# Patient Record
Sex: FEMALE | Race: BLACK OR AFRICAN AMERICAN | NOT HISPANIC OR LATINO | RURAL
[De-identification: names, ages, dates, MRNs, and addresses within clinical notes are randomized per-mention and may not be internally consistent; named-entity substitution may affect disease eponyms.]

---

## 2017-07-25 ENCOUNTER — HISTORICAL (OUTPATIENT)
Dept: ADMINISTRATIVE | Facility: HOSPITAL | Age: 32
End: 2017-07-25

## 2017-07-26 LAB
LAB AP CLINICAL INFORMATION: NORMAL
LAB AP DIAGNOSIS - HISTORICAL: NORMAL
LAB AP GROSS PATHOLOGY - HISTORICAL: NORMAL
LAB AP SPECIMEN SUBMITTED - HISTORICAL: NORMAL

## 2019-07-19 ENCOUNTER — HISTORICAL (OUTPATIENT)
Dept: ADMINISTRATIVE | Facility: HOSPITAL | Age: 34
End: 2019-07-19

## 2019-07-24 LAB
LAB AP CLINICAL INFORMATION: NORMAL
LAB AP COMMENTS: NORMAL
LAB AP GENERAL CAT - HISTORICAL: NORMAL
LAB AP INTERPRETATION/RESULT - HISTORICAL: NEGATIVE
LAB AP SPECIMEN ADEQUACY - HISTORICAL: NORMAL
LAB AP SPECIMEN SUBMITTED - HISTORICAL: NORMAL

## 2020-10-07 ENCOUNTER — HISTORICAL (OUTPATIENT)
Dept: ADMINISTRATIVE | Facility: HOSPITAL | Age: 35
End: 2020-10-07

## 2021-04-21 ENCOUNTER — TELEPHONE (OUTPATIENT)
Dept: OBSTETRICS AND GYNECOLOGY | Facility: CLINIC | Age: 36
End: 2021-04-21

## 2021-10-05 ENCOUNTER — OFFICE VISIT (OUTPATIENT)
Dept: FAMILY MEDICINE | Facility: CLINIC | Age: 36
End: 2021-10-05
Payer: MEDICAID

## 2021-10-05 VITALS — TEMPERATURE: 98 F | OXYGEN SATURATION: 98 % | HEART RATE: 89 BPM

## 2021-10-05 DIAGNOSIS — N30.00 ACUTE CYSTITIS WITHOUT HEMATURIA: ICD-10-CM

## 2021-10-05 DIAGNOSIS — Z11.52 ENCOUNTER FOR SCREENING FOR COVID-19: Primary | ICD-10-CM

## 2021-10-05 LAB
CTP QC/QA: YES
SARS-COV-2 AG RESP QL IA.RAPID: NEGATIVE

## 2021-10-05 PROCEDURE — 99051 PR MEDICAL SERVICES, EVE/WKEND/HOLIDAY: ICD-10-PCS | Mod: ,,, | Performed by: FAMILY MEDICINE

## 2021-10-05 PROCEDURE — 99213 PR OFFICE/OUTPT VISIT, EST, LEVL III, 20-29 MIN: ICD-10-PCS | Mod: ,,, | Performed by: FAMILY MEDICINE

## 2021-10-05 PROCEDURE — 99213 OFFICE O/P EST LOW 20 MIN: CPT | Mod: ,,, | Performed by: FAMILY MEDICINE

## 2021-10-05 PROCEDURE — 99051 MED SERV EVE/WKEND/HOLIDAY: CPT | Mod: ,,, | Performed by: FAMILY MEDICINE

## 2021-10-05 PROCEDURE — 87426 SARSCOV CORONAVIRUS AG IA: CPT | Mod: RHCUB | Performed by: FAMILY MEDICINE

## 2021-10-05 RX ORDER — NITROFURANTOIN 25; 75 MG/1; MG/1
100 CAPSULE ORAL 2 TIMES DAILY
Qty: 14 CAPSULE | Refills: 0 | Status: SHIPPED | OUTPATIENT
Start: 2021-10-05

## 2024-04-04 DIAGNOSIS — J30.9 ALLERGIC RHINITIS: Primary | ICD-10-CM

## 2024-04-10 ENCOUNTER — OFFICE VISIT (OUTPATIENT)
Dept: OTOLARYNGOLOGY | Facility: CLINIC | Age: 39
End: 2024-04-10
Payer: MEDICAID

## 2024-04-10 VITALS — BODY MASS INDEX: 40.12 KG/M2 | WEIGHT: 235 LBS | HEIGHT: 64 IN

## 2024-04-10 DIAGNOSIS — J30.9 ALLERGIC RHINITIS: ICD-10-CM

## 2024-04-10 DIAGNOSIS — J31.0 CHRONIC RHINITIS: Primary | ICD-10-CM

## 2024-04-10 PROCEDURE — 99204 OFFICE O/P NEW MOD 45 MIN: CPT | Mod: S$PBB,,, | Performed by: OTOLARYNGOLOGY

## 2024-04-10 PROCEDURE — 99213 OFFICE O/P EST LOW 20 MIN: CPT | Mod: PBBFAC | Performed by: OTOLARYNGOLOGY

## 2024-04-10 PROCEDURE — 1159F MED LIST DOCD IN RCRD: CPT | Mod: CPTII,,, | Performed by: OTOLARYNGOLOGY

## 2024-04-10 PROCEDURE — 1160F RVW MEDS BY RX/DR IN RCRD: CPT | Mod: CPTII,,, | Performed by: OTOLARYNGOLOGY

## 2024-04-10 PROCEDURE — 3008F BODY MASS INDEX DOCD: CPT | Mod: CPTII,,, | Performed by: OTOLARYNGOLOGY

## 2024-04-10 NOTE — PROGRESS NOTES
Subjective:       Patient ID: Becky Romero is a 39 y.o. female.    Chief Complaint: Allergic Rhinitis  (Pt states she has allergies, sinus drainage w/PND of thick white to green mucus. )    HPI  Review of Systems   HENT:  Positive for congestion, postnasal drip, rhinorrhea, sinus pressure and sinus pain.    All other systems reviewed and are negative.      Objective:      Physical Exam  General: NAD  Head: Normocephalic, atraumatic, no facial asymmetry/normal strength,  Ears: Both auricules normal in appearance, w/o deformities tympanic membranes normal external auditory canals normal  Nose: External nose w/o deformities congested turbinates no drainage or inflammation  Oral Cavity: Lips, gums, floor of mouth, tongue hard palate, and buccal mucosa without mass/lesion  Oropharynx: Mucosa pink and moist, soft palate, posterior pharynx and oropharyngeal wall without mass/lesion  Neck: Supple, symmetric, trachea midline, no palpable mass/lesion, no palpable cervical lymphadenopathy  Skin: Warm and dry, no concerning lesions  Respiratory: Respirations even, unlabored  Assessment:       1. Chronic rhinitis    2. Allergic rhinitis        Plan:       RAST for allergies may need CT sinus if negative

## 2024-04-21 ENCOUNTER — OFFICE VISIT (OUTPATIENT)
Dept: FAMILY MEDICINE | Facility: CLINIC | Age: 39
End: 2024-04-21
Payer: MEDICAID

## 2024-04-21 VITALS
RESPIRATION RATE: 18 BRPM | DIASTOLIC BLOOD PRESSURE: 87 MMHG | OXYGEN SATURATION: 96 % | TEMPERATURE: 98 F | SYSTOLIC BLOOD PRESSURE: 132 MMHG | HEIGHT: 64 IN | WEIGHT: 231 LBS | HEART RATE: 95 BPM | BODY MASS INDEX: 39.44 KG/M2

## 2024-04-21 DIAGNOSIS — J30.9 ALLERGIC RHINITIS, UNSPECIFIED SEASONALITY, UNSPECIFIED TRIGGER: Primary | ICD-10-CM

## 2024-04-21 DIAGNOSIS — Z11.59 ENCOUNTER FOR SCREENING FOR OTHER VIRAL DISEASES: ICD-10-CM

## 2024-04-21 LAB
CTP QC/QA: YES
MOLECULAR STREP A: NEGATIVE
POC MOLECULAR INFLUENZA A AGN: NEGATIVE
POC MOLECULAR INFLUENZA B AGN: NEGATIVE
SARS-COV-2 RDRP RESP QL NAA+PROBE: NEGATIVE

## 2024-04-21 PROCEDURE — 3008F BODY MASS INDEX DOCD: CPT | Mod: CPTII,,, | Performed by: NURSE PRACTITIONER

## 2024-04-21 PROCEDURE — 3075F SYST BP GE 130 - 139MM HG: CPT | Mod: CPTII,,, | Performed by: NURSE PRACTITIONER

## 2024-04-21 PROCEDURE — 1160F RVW MEDS BY RX/DR IN RCRD: CPT | Mod: CPTII,,, | Performed by: NURSE PRACTITIONER

## 2024-04-21 PROCEDURE — 99213 OFFICE O/P EST LOW 20 MIN: CPT | Mod: ,,, | Performed by: NURSE PRACTITIONER

## 2024-04-21 PROCEDURE — 87635 SARS-COV-2 COVID-19 AMP PRB: CPT | Mod: RHCUB | Performed by: NURSE PRACTITIONER

## 2024-04-21 PROCEDURE — 87651 STREP A DNA AMP PROBE: CPT | Mod: RHCUB | Performed by: NURSE PRACTITIONER

## 2024-04-21 PROCEDURE — 1159F MED LIST DOCD IN RCRD: CPT | Mod: CPTII,,, | Performed by: NURSE PRACTITIONER

## 2024-04-21 PROCEDURE — 87502 INFLUENZA DNA AMP PROBE: CPT | Mod: RHCUB | Performed by: NURSE PRACTITIONER

## 2024-04-21 PROCEDURE — 3079F DIAST BP 80-89 MM HG: CPT | Mod: CPTII,,, | Performed by: NURSE PRACTITIONER

## 2024-04-21 PROCEDURE — 99051 MED SERV EVE/WKEND/HOLIDAY: CPT | Mod: ,,, | Performed by: NURSE PRACTITIONER

## 2024-04-21 RX ORDER — FERROUS SULFATE 325(65) MG
TABLET ORAL
COMMUNITY
End: 2024-05-03

## 2024-04-21 RX ORDER — FLUTICASONE PROPIONATE 50 MCG
1 SPRAY, SUSPENSION (ML) NASAL DAILY
Qty: 15.8 ML | Refills: 0 | Status: SHIPPED | OUTPATIENT
Start: 2024-04-21 | End: 2024-05-01

## 2024-04-21 RX ORDER — PHENTERMINE HYDROCHLORIDE 37.5 MG/1
TABLET ORAL
COMMUNITY
End: 2024-04-21 | Stop reason: ALTCHOICE

## 2024-04-21 RX ORDER — ALBUTEROL SULFATE 90 UG/1
2 AEROSOL, METERED RESPIRATORY (INHALATION) EVERY 4 HOURS
COMMUNITY

## 2024-04-21 RX ORDER — METFORMIN HYDROCHLORIDE 500 MG/1
1 TABLET ORAL 2 TIMES DAILY
COMMUNITY

## 2024-04-21 RX ORDER — SERTRALINE HYDROCHLORIDE 50 MG/1
50 TABLET, FILM COATED ORAL EVERY MORNING
COMMUNITY
Start: 2024-03-22

## 2024-04-21 NOTE — PROGRESS NOTES
ALLIE Warner   [facility] - [facility] - [facility]  [facility] [address] [place] [address]  [phone]      PATIENT NAME: Becky Romero  : 1985  DATE: 24  MRN: [id number]      Billing Provider: ALLIE Warner  Level of Service: [place] OFFICE/OUTPT VISIT, EST, LEVL III, 20-29 MIN  Patient PCP Information       Provider PCP Type    Primary Doctor No General            Reason for Visit / Chief Complaint: Nasal Congestion, Sore Throat, Cough, and Otalgia (R ear pain /Symptoms started x2days/)       Update PCP  Update Chief Complaint         History of Present Illness / Problem Focused Workflow     Patient presents to clinic with the above listed complaints. Patient declined steroid shots today. States she will continue over the counter medications.       Review of Systems     Review of Systems   Constitutional:  Negative for chills, diaphoresis, fatigue and fever.   HENT:  Positive for congestion, ear pain and sore throat. Negative for facial swelling and trouble swallowing.    Eyes:  Negative for pain, discharge, redness, itching and visual disturbance.   Respiratory:  Positive for cough. Negative for apnea, chest tightness, shortness of breath and wheezing.    Cardiovascular:  Negative for chest pain, palpitations and leg swelling.   Gastrointestinal:  Negative for abdominal pain, constipation, diarrhea, nausea and vomiting.   Genitourinary:  Negative for dysuria.   Skin:  Negative for rash.   Neurological:  Negative for dizziness and headaches.       Medical / Social / Family History   No past medical history on file.    No past surgical history on file.    Social History  Ms.  reports that she has never smoked. She has never been exposed to tobacco smoke. She has never used smokeless tobacco. She reports that she does not drink alcohol and does not use drugs.    Family History  Ms.'s family history is not on  file.    Medications and Allergies     Medications  Current Outpatient Medications   Medication Sig Dispense Refill    albuterol (PROVENTIL/VENTOLIN HFA) 90 mcg/actuation inhaler Inhale 2 puffs into the lungs every 4 (four) hours.      ferrous sulfate (FEOSOL) 325 mg (65 mg iron) Tab tablet Take 1 tablet every day by oral route as directed for 30 days.      metFORMIN (GLUCOPHAGE) 500 MG tablet Take 1 tablet by mouth 2 (two) times daily.      phentermine (ADIPEX-P) 37.5 mg tablet TAKE 1 TABLET BY MOUTH ONCE DAILY IN THE MORNING FOR 30 DAYS      sertraline (ZOLOFT) 50 MG tablet Take 50 mg by mouth every morning.      metFORMIN (GLUCOPHAGE) 1000 MG tablet Take 1 tablet (1,000 mg total) by mouth 2 (two) times daily with meals. (Patient not taking: Reported on 4/21/2024) 60 tablet 0    nitrofurantoin, macrocrystal-monohydrate, (MACROBID) 100 MG capsule Take 1 capsule (100 mg total) by mouth 2 (two) times daily. (Patient not taking: Reported on 4/21/2024) 14 capsule 0     No current facility-administered medications for this visit.       Allergies  Review of patient's allergies indicates:   Allergen Reactions    Topiramate Rash and Other (See Comments)       Physical Examination     Vitals:    04/21/24 1052   BP: 132/87   Pulse: 95   Resp: 18   Temp: 98.3 °F (36.8 °C)     Physical Exam  Vitals and nursing note reviewed.   Constitutional:       General: She is awake.      Appearance: Normal appearance.   HENT:      Head: Normocephalic.      Right Ear: Tympanic membrane, ear canal and external ear normal.      Left Ear: Tympanic membrane, ear canal and external ear normal.      Nose: Congestion and rhinorrhea present.      Right Turbinates: Swollen.      Left Turbinates: Swollen.      Right Sinus: No maxillary sinus tenderness or frontal sinus tenderness.      Left Sinus: No maxillary sinus tenderness or frontal sinus tenderness.      Mouth/Throat:      Lips: Pink.      Mouth: Mucous membranes are moist.      Pharynx:  Oropharynx is clear. Uvula midline. No posterior oropharyngeal erythema.      Tonsils: No tonsillar exudate.   Eyes:      General: Lids are normal.      Extraocular Movements: Extraocular movements intact.      Conjunctiva/sclera: Conjunctivae normal.      Pupils: Pupils are equal, round, and reactive to light.   Cardiovascular:      Rate and Rhythm: Normal rate and regular rhythm.      Pulses: Normal pulses.      Heart sounds: Normal heart sounds. No murmur heard.  Pulmonary:      Effort: Pulmonary effort is normal.      Breath sounds: Normal breath sounds. No decreased breath sounds, wheezing, rhonchi or rales.   Musculoskeletal:      Right lower leg: No edema.      Left lower leg: No edema.   Skin:     General: Skin is warm.      Coloration: Skin is not jaundiced.      Findings: No rash.   Neurological:      Mental Status: She is alert. Mental status is at baseline.   Psychiatric:         Mood and Affect: Mood normal.         Behavior: Behavior normal. Behavior is cooperative.       Assessment and Plan (including Health Maintenance)      Problem List  Smart Sets  Document Outside HM   :    Health Maintenance Due   Topic Date Due    Hepatitis C Screening  Never done    Lipid Panel  Never done    HIV Screening  Never done    TETANUS VACCINE  Never done    Hemoglobin A1c (Diabetic Prevention Screening)  Never done    Cervical Cancer Screening  07/19/2022    Influenza Vaccine (1) Never done    COVID-19 Vaccine (1 - 2023-24 season) Never done       Problem List Items Addressed This Visit    None  Visit Diagnoses       Encounter for screening for other viral diseases    -  Primary    Relevant Orders    POCT COVID-19 Rapid Screening    POCT Strep A, Molecular    POCT Influenza A/B Molecular            The patient has no Health Maintenance topics of status Not Due    No future appointments.       Signature:  ALLIE [facility]  [address] [address]  [phone]    Date of encounter: 4/21/24

## 2024-05-03 ENCOUNTER — OFFICE VISIT (OUTPATIENT)
Dept: OBSTETRICS AND GYNECOLOGY | Facility: CLINIC | Age: 39
End: 2024-05-03
Payer: MEDICAID

## 2024-05-03 VITALS
RESPIRATION RATE: 17 BRPM | BODY MASS INDEX: 38.41 KG/M2 | DIASTOLIC BLOOD PRESSURE: 89 MMHG | HEIGHT: 64 IN | WEIGHT: 225 LBS | OXYGEN SATURATION: 99 % | TEMPERATURE: 98 F | HEART RATE: 86 BPM | SYSTOLIC BLOOD PRESSURE: 139 MMHG

## 2024-05-03 DIAGNOSIS — N92.0 MENORRHAGIA WITH REGULAR CYCLE: Primary | ICD-10-CM

## 2024-05-03 DIAGNOSIS — R73.03 PREDIABETES: ICD-10-CM

## 2024-05-03 DIAGNOSIS — E66.9 OBESITY (BMI 30-39.9): ICD-10-CM

## 2024-05-03 DIAGNOSIS — D50.0 IRON DEFICIENCY ANEMIA DUE TO CHRONIC BLOOD LOSS: ICD-10-CM

## 2024-05-03 DIAGNOSIS — E55.9 VITAMIN D INSUFFICIENCY: ICD-10-CM

## 2024-05-03 PROCEDURE — 1159F MED LIST DOCD IN RCRD: CPT | Mod: CPTII,,, | Performed by: ADVANCED PRACTICE MIDWIFE

## 2024-05-03 PROCEDURE — 96372 THER/PROPH/DIAG INJ SC/IM: CPT | Mod: ,,, | Performed by: ADVANCED PRACTICE MIDWIFE

## 2024-05-03 PROCEDURE — 3008F BODY MASS INDEX DOCD: CPT | Mod: CPTII,,, | Performed by: ADVANCED PRACTICE MIDWIFE

## 2024-05-03 PROCEDURE — 3079F DIAST BP 80-89 MM HG: CPT | Mod: CPTII,,, | Performed by: ADVANCED PRACTICE MIDWIFE

## 2024-05-03 PROCEDURE — 99213 OFFICE O/P EST LOW 20 MIN: CPT | Mod: 25,,, | Performed by: ADVANCED PRACTICE MIDWIFE

## 2024-05-03 PROCEDURE — 3075F SYST BP GE 130 - 139MM HG: CPT | Mod: CPTII,,, | Performed by: ADVANCED PRACTICE MIDWIFE

## 2024-05-03 RX ORDER — PHENTERMINE HYDROCHLORIDE 37.5 MG/1
37.5 TABLET ORAL
Qty: 30 TABLET | Refills: 0 | Status: SHIPPED | OUTPATIENT
Start: 2024-05-03 | End: 2024-06-02

## 2024-05-03 RX ORDER — MEDROXYPROGESTERONE ACETATE 150 MG/ML
150 INJECTION, SUSPENSION INTRAMUSCULAR ONCE
Status: COMPLETED | OUTPATIENT
Start: 2024-05-03 | End: 2024-05-03

## 2024-05-03 RX ORDER — FERROUS SULFATE 325(65) MG
325 TABLET, DELAYED RELEASE (ENTERIC COATED) ORAL 2 TIMES DAILY
Qty: 60 TABLET | Refills: 2 | Status: SHIPPED | OUTPATIENT
Start: 2024-05-03 | End: 2024-08-01

## 2024-05-03 RX ORDER — PHENTERMINE HYDROCHLORIDE 37.5 MG/1
37.5 TABLET ORAL
COMMUNITY
Start: 2024-04-01 | End: 2024-05-03 | Stop reason: SDUPTHER

## 2024-05-03 RX ADMIN — MEDROXYPROGESTERONE ACETATE 150 MG: 150 INJECTION, SUSPENSION INTRAMUSCULAR at 03:05

## 2024-05-03 NOTE — PROGRESS NOTES
"  Patient ID:  Becky Romero is a 39 y.o. female      Chief Complaint:   Chief Complaint   Patient presents with    Vaginal Bleeding     Heavy bleeding        HPI:  Becky Romero is in with c/o heavy menstrual cycle. Cycles occur monthly lasting 7 days with 5 days of heavy bleeding. Has had BTL. Reports hx anemia. Reviewed labs obtained by PCP on 3/2024. H&H 9.5/31.5, HgbA1C 5.9 increased from 5.7 in , vitamin D level increased to 26 from 17. Discussed management of menorrhagia with hormonal contraceptive. Has used depo in past with no s/e, desires to start for menses suppression. Agrees to rx for treatment of anemia. Has lost 10 lbs since starting Adipex, requesting refill. Due for annual exam with Pap. Last Pap NILM 2019.   LMP: Patient's last menstrual period was 2024.  Sexually active: yes, declines STD testing    Past Medical History:   Diagnosis Date    Anemia     Anxiety     Cancer     Depression        Past Surgical History:   Procedure Laterality Date    CERVICAL BIOPSY  W/ LOOP ELECTRODE EXCISION       SECTION      TUBAL LIGATION         OB History          8    Para   7    Term                AB        Living             SAB        IAB        Ectopic        Multiple        Live Births   7           Obstetric Comments   Vaginal births x 5,  x 2               /89 (BP Location: Left arm)   Pulse 86   Temp 98.2 °F (36.8 °C)   Resp 17   Ht 5' 4" (1.626 m)   Wt 102.1 kg (225 lb)   LMP 2024   SpO2 99%   BMI 38.62 kg/m²   Wt Readings from Last 3 Encounters:   24 102.1 kg (225 lb)   24 104.8 kg (231 lb)   04/10/24 106.6 kg (235 lb)          ROS:  Review of Systems   Constitutional: Negative.    Eyes: Negative.    Respiratory: Negative.     Cardiovascular: Negative.    Gastrointestinal: Negative.    Genitourinary:  Positive for menorrhagia and menstrual problem.   Musculoskeletal: Negative.    Neurological: Negative.  "   Psychiatric/Behavioral: Negative.            PHYSICAL EXAM:  Physical Exam  Constitutional:       Appearance: Normal appearance. She is obese.   Eyes:      Extraocular Movements: Extraocular movements intact.   Cardiovascular:      Rate and Rhythm: Normal rate.      Pulses: Normal pulses.   Pulmonary:      Effort: Pulmonary effort is normal.   Musculoskeletal:         General: Normal range of motion.      Cervical back: Normal range of motion.   Skin:     General: Skin is warm and dry.   Neurological:      Mental Status: She is alert and oriented to person, place, and time.   Psychiatric:         Mood and Affect: Mood normal.         Behavior: Behavior normal.         Thought Content: Thought content normal.         Judgment: Judgment normal.          Assessment:  Menorrhagia with regular cycle  -     medroxyPROGESTERone (DEPO-PROVERA) syringe 150 mg  -     POCT urine pregnancy    Iron deficiency anemia due to chronic blood loss  -     ferrous sulfate 325 (65 FE) MG EC tablet; Take 1 tablet (325 mg total) by mouth 2 (two) times daily.  Dispense: 60 tablet; Refill: 2    Obesity (BMI 30-39.9)  -     phentermine (ADIPEX-P) 37.5 mg tablet; Take 1 tablet (37.5 mg total) by mouth before breakfast.  Dispense: 30 tablet; Refill: 0    Prediabetes  -     phentermine (ADIPEX-P) 37.5 mg tablet; Take 1 tablet (37.5 mg total) by mouth before breakfast.  Dispense: 30 tablet; Refill: 0    Vitamin D insufficiency          ICD-10-CM ICD-9-CM    1. Menorrhagia with regular cycle  N92.0 626.2 medroxyPROGESTERone (DEPO-PROVERA) syringe 150 mg      POCT urine pregnancy      2. Iron deficiency anemia due to chronic blood loss  D50.0 280.0 ferrous sulfate 325 (65 FE) MG EC tablet      3. Obesity (BMI 30-39.9)  E66.9 278.00 phentermine (ADIPEX-P) 37.5 mg tablet      4. Prediabetes  R73.03 790.29 phentermine (ADIPEX-P) 37.5 mg tablet      5. Vitamin D insufficiency  E55.9 268.9           Plan:  Depo Provera 150 mg IM given, Reviewed  ""ACHES" of contraceptives and possible S/E  Encouraged Vitamin D and calcium supplements or daily multivitamin  Instructed on condom use during each sexual encounter to decrease STD exposure risk  Discussed continued lifestyle changes to decrease diabetes risk  Encouraged exercising at least 3-4 x weekly, increased water intake, avoid sugary food and beverages, choose healthier dietary options  Rx sent for Adipex refill, instructed no further refills will be given, encouraged to schedule follow up appointment with PADDY KELLEY  Rx sent for ferrous sulfate Bid to treat anemia, encouraged to eat more iron rich foods  Encouraged daily multivitamin or Vitamin D3 supplement    Follow up for annual exam with Pap.            Answers submitted by the patient for this visit:  Vaginal Bleeding Questionnaire  (Submitted on 5/3/2024)  Chief Complaint: Vaginal bleeding  Onset: more than 1 year ago  Progression since onset: gradually worsening  Pain severity: severe  Affected side: both  Pregnant now?: No  abdominal pain: No  anorexia: No  back pain: Yes  chills: No  constipation: Yes  diarrhea: No  discolored urine: No  dysuria: No  fever: No  flank pain: Yes  frequency: No  headaches: Yes  hematuria: No  nausea: No  painful intercourse: No  rash: No  urgency: No  vomiting: No  Vaginal bleeding: heavier than menses  Passing clots?: Yes  Passing tissue?: No  Aggravated by: activity, bowel movements, heavy lifting  treatments tried: acetaminophen, anti-fungal cream, heating pad, NSAIDs, warm bath  Improvement on treatment: no relief  Sexual activity: sexually active  Partner with STD symptoms: no  STD: No  abdominal surgery: No   section: Yes  Ectopic pregnancy: No  herpes simplex: No  gynecological surgery: No  menorrhagia: Yes  metrorrhagia: No  miscarriage: Yes  ovarian cysts: Yes  perineal abscess: No  PID: No  terminated pregnancy: No  vaginosis: No    "

## 2024-09-06 DIAGNOSIS — D22.9 MELANOCYTIC NEVUS OF SKIN: Primary | ICD-10-CM

## 2024-09-16 ENCOUNTER — OFFICE VISIT (OUTPATIENT)
Dept: OBSTETRICS AND GYNECOLOGY | Facility: CLINIC | Age: 39
End: 2024-09-16
Payer: MEDICAID

## 2024-09-16 VITALS
HEIGHT: 64 IN | SYSTOLIC BLOOD PRESSURE: 120 MMHG | RESPIRATION RATE: 17 BRPM | OXYGEN SATURATION: 99 % | WEIGHT: 255.63 LBS | HEART RATE: 98 BPM | DIASTOLIC BLOOD PRESSURE: 80 MMHG | BODY MASS INDEX: 43.64 KG/M2

## 2024-09-16 DIAGNOSIS — Z01.419 WOMEN'S ANNUAL ROUTINE GYNECOLOGICAL EXAMINATION: Primary | ICD-10-CM

## 2024-09-16 DIAGNOSIS — Z11.3 ENCOUNTER FOR SPECIAL SCREENING EXAMINATION FOR INFECTION WITH PREDOMINANTLY SEXUAL MODE OF TRANSMISSION: ICD-10-CM

## 2024-09-16 DIAGNOSIS — Z11.51 SCREENING FOR HPV (HUMAN PAPILLOMAVIRUS): ICD-10-CM

## 2024-09-16 DIAGNOSIS — Z12.4 ENCOUNTER FOR PAPANICOLAOU SMEAR FOR CERVICAL CANCER SCREENING: ICD-10-CM

## 2024-09-16 DIAGNOSIS — Z72.51 HIGH RISK HETEROSEXUAL BEHAVIOR: ICD-10-CM

## 2024-09-16 DIAGNOSIS — E66.01 MORBID OBESITY WITH BMI OF 40.0-44.9, ADULT: ICD-10-CM

## 2024-09-16 LAB
CANDIDA SPECIES: NEGATIVE
GARDNERELLA: POSITIVE
TRICHOMONAS: NEGATIVE

## 2024-09-16 PROCEDURE — 87480 CANDIDA DNA DIR PROBE: CPT | Mod: ,,, | Performed by: CLINICAL MEDICAL LABORATORY

## 2024-09-16 PROCEDURE — 87491 CHLMYD TRACH DNA AMP PROBE: CPT | Mod: ,,, | Performed by: CLINICAL MEDICAL LABORATORY

## 2024-09-16 PROCEDURE — 1159F MED LIST DOCD IN RCRD: CPT | Mod: CPTII,,, | Performed by: ADVANCED PRACTICE MIDWIFE

## 2024-09-16 PROCEDURE — 87660 TRICHOMONAS VAGIN DIR PROBE: CPT | Mod: ,,, | Performed by: CLINICAL MEDICAL LABORATORY

## 2024-09-16 PROCEDURE — 3074F SYST BP LT 130 MM HG: CPT | Mod: CPTII,,, | Performed by: ADVANCED PRACTICE MIDWIFE

## 2024-09-16 PROCEDURE — 88142 CYTOPATH C/V THIN LAYER: CPT | Mod: TC,GCY | Performed by: ADVANCED PRACTICE MIDWIFE

## 2024-09-16 PROCEDURE — 87624 HPV HI-RISK TYP POOLED RSLT: CPT | Mod: ,,, | Performed by: CLINICAL MEDICAL LABORATORY

## 2024-09-16 PROCEDURE — 87591 N.GONORRHOEAE DNA AMP PROB: CPT | Mod: ,,, | Performed by: CLINICAL MEDICAL LABORATORY

## 2024-09-16 PROCEDURE — 3079F DIAST BP 80-89 MM HG: CPT | Mod: CPTII,,, | Performed by: ADVANCED PRACTICE MIDWIFE

## 2024-09-16 PROCEDURE — 3008F BODY MASS INDEX DOCD: CPT | Mod: CPTII,,, | Performed by: ADVANCED PRACTICE MIDWIFE

## 2024-09-16 PROCEDURE — 87510 GARDNER VAG DNA DIR PROBE: CPT | Mod: ,,, | Performed by: CLINICAL MEDICAL LABORATORY

## 2024-09-16 PROCEDURE — 99395 PREV VISIT EST AGE 18-39: CPT | Mod: ,,, | Performed by: ADVANCED PRACTICE MIDWIFE

## 2024-09-16 RX ORDER — PHENTERMINE HYDROCHLORIDE 37.5 MG/1
37.5 TABLET ORAL EVERY MORNING
COMMUNITY
Start: 2024-09-03

## 2024-09-16 NOTE — PROGRESS NOTES
Patient ID: Becky Romero is a 39 y.o. female     Chief Complaint:   Chief Complaint   Patient presents with    Annual Exam    STD CHECK       HPI: Becky Romero is a 39 y.o. female who presents for well woman exam with Pap.  No issues, problems, or complaints. Reports past hx of early stage cervical cancer in , received care in [place]. Pap tests NILM 2019 & 2016. Has hx heavy menstrual bleeding treated with depo injection 2024. Has not returned for any further injections, no bleeding since initial treatment.   LMP: No LMP recorded.  Sexually active: yes   Contraceptive: BTL  Mammogram: n/a    Past Medical History:   Diagnosis Date    Anemia     Anxiety     Cancer     Depression        Past Surgical History:   Procedure Laterality Date    CERVICAL BIOPSY  W/ LOOP ELECTRODE EXCISION       SECTION      TUBAL LIGATION         Social History     Socioeconomic History    Marital status:    Tobacco Use    Smoking status: Never     Passive exposure: Never    Smokeless tobacco: Never   Substance and Sexual Activity    Alcohol use: Never    Drug use: Never    Sexual activity: Yes     Partners: Male     Birth control/protection: Abstinence, Condom, I.U.D.     Social Determinants of Health     Financial Resource Strain: Low Risk  (2024)    Overall Financial Resource Strain (CARDIA)     Difficulty of Paying Living Expenses: Not very hard   Food Insecurity: Food Insecurity Present (2024)    Hunger Vital Sign     Worried About Running Out of Food in the Last Year: Sometimes true     Ran Out of Food in the Last Year: Never true   Physical Activity: Insufficiently Active (2024)    Exercise Vital Sign     Days of Exercise per Week: 3 days     Minutes of Exercise per Session: 30 min   Stress: Stress Concern Present (2024)    [employer] [place] [employer] - Occupational Stress Questionnaire     Feeling of Stress : Very much   Housing Stability: Unknown  "(2024)    Housing Stability Vital Sign     Unable to Pay for Housing in the Last Year: No       Family History   Problem Relation Name Age of Onset    Asthma Mother Kylah     Migraines Mother Kylah     Cervical cancer Mother Kylah        OB History          8    Para   7    Term                AB        Living             SAB        IAB        Ectopic        Multiple        Live Births   7           Obstetric Comments   Vaginal births x 5,  x 2               /80 (BP Location: Left arm)   Pulse 98   Resp 17   Ht 5' 4" (1.626 m)   Wt 115.9 kg (255 lb 9.6 oz)   SpO2 99%   BMI 43.87 kg/m²     Wt Readings from Last 3 Encounters:   24 115.9 kg (255 lb 9.6 oz)   24 102.1 kg (225 lb)   24 104.8 kg (231 lb)        Review of Systems   Constitutional: Negative.    Eyes: Negative.    Respiratory: Negative.     Cardiovascular: Negative.    Gastrointestinal: Negative.    Endocrine: Negative.    Genitourinary: Negative.    Musculoskeletal: Negative.    Integumentary:  Negative.   Neurological: Negative.    Hematological: Negative.    Psychiatric/Behavioral: Negative.     Breast: negative.         Physical Exam   GENERAL: Well-developed, well-nourished morbidly obese female in no acute distress  NECK: Supple with full range of motion. No adenopathy, masses, or thyromegaly  SKIN: Normal to inspection with no rashes, lesions, or ulcers  LUNGS: Clear bilaterally with no rales, rhonchi, or wheezes   CARDIOVASCULAR AUSCULTATION: Normal heart rate and rhythm  BREASTS: No masses, lumps, discharge, tenderness, or skin changes  LYMPH NODES: No axillary, or inguinal adenopathy  ABDOMEN: large, Soft, non tender, without masses. No palpable hernia or  hepatosplenomegaly  VULVA: General appearance normal; external genitalia without lesions or rash  URETHRA: Normal size and location with no lesions or prolapse  VAGINA: Mucosa normal, no discharge or lesions  CERVIX: Pink without " lesions, discharge or tenderness  UTERUS: Regular, mobile, and non tender;  consistency is normal. No evidence of adnexa masses, tenderness, or nodularity  ANUS: No lesions or relaxation.  LOWER EXTREMITIES: No edema or tenderness  PSYCHIATRIC: Patient is oriented to person, place, and time. Mood and affect are normal      Assessment:  Women's annual routine gynecological examination  -     Bacterial Vaginosis; Future; Expected date: 09/16/2024  -     Chlamydia/GC, PCR; Future; Expected date: 09/16/2024  -     ThinPrep Pap Test; Future; Expected date: 09/16/2024    Encounter for Papanicolaou smear for cervical cancer screening  -     ThinPrep Pap Test; Future; Expected date: 09/16/2024    Screening for HPV (human papillomavirus)  -     ThinPrep Pap Test; Future; Expected date: 09/16/2024    High risk heterosexual behavior  -     Bacterial Vaginosis; Future; Expected date: 09/16/2024  -     Chlamydia/GC, PCR; Future; Expected date: 09/16/2024    Encounter for special screening examination for infection with predominantly sexual mode of transmission  -     Bacterial Vaginosis; Future; Expected date: 09/16/2024  -     Chlamydia/GC, PCR; Future; Expected date: 09/16/2024    Morbid obesity with BMI of 40.0-44.9, adult          ICD-10-CM ICD-9-CM    1. Women's annual routine gynecological examination  Z01.419 V72.31 Bacterial Vaginosis      Chlamydia/GC, PCR      ThinPrep Pap Test      Chlamydia/GC, PCR      2. Encounter for Papanicolaou smear for cervical cancer screening  Z12.4 V76.2 ThinPrep Pap Test      3. Screening for HPV (human papillomavirus)  Z11.51 V73.81 ThinPrep Pap Test      4. High risk heterosexual behavior  Z72.51 V69.2 Bacterial Vaginosis      Chlamydia/GC, PCR      Chlamydia/GC, PCR      5. Encounter for special screening examination for infection with predominantly sexual mode of transmission  Z11.3 V74.5 Bacterial Vaginosis      Chlamydia/GC, PCR      Chlamydia/GC, PCR      6. Morbid obesity with BMI  of 40.0-44.9, adult  E66.01 278.01     Z68.41 V85.41           Plan:  Annual exam completed, Pap and affirm specimens obtained  Urine sent for GC/CT testing  Will call or send My Chart message after results reviewed  Patient was counseled today on ASCCP Pap with HPV  guidelines and recommendations for yearly pelvic exams     Follow up in about 1 year (around 9/16/2025) for annual gyn exam.

## 2024-09-17 DIAGNOSIS — N76.0 BACTERIAL VAGINAL INFECTION: Primary | ICD-10-CM

## 2024-09-17 DIAGNOSIS — B96.89 BACTERIAL VAGINAL INFECTION: Primary | ICD-10-CM

## 2024-09-17 LAB
CHLAMYDIA BY PCR: NEGATIVE
GH SERPL-MCNC: NORMAL NG/ML
INSULIN SERPL-ACNC: NORMAL U[IU]/ML
LAB AP CLINICAL INFORMATION: NORMAL
LAB AP GYN INTERPRETATION: NEGATIVE
LAB AP PAP DISCLAIMER COMMENTS: NORMAL
N. GONORRHOEAE (GC) BY PCR: NEGATIVE
RENIN PLAS-CCNC: NORMAL NG/ML/H

## 2024-09-17 RX ORDER — FLUCONAZOLE 150 MG/1
150 TABLET ORAL
Qty: 2 TABLET | Refills: 0 | Status: SHIPPED | OUTPATIENT
Start: 2024-09-17 | End: 2024-09-25

## 2024-09-17 RX ORDER — METRONIDAZOLE 500 MG/1
500 TABLET ORAL 2 TIMES DAILY
Qty: 14 TABLET | Refills: 0 | Status: SHIPPED | OUTPATIENT
Start: 2024-09-17 | End: 2024-09-24

## 2024-09-19 ENCOUNTER — OFFICE VISIT (OUTPATIENT)
Dept: OTOLARYNGOLOGY | Facility: CLINIC | Age: 39
End: 2024-09-19
Payer: MEDICAID

## 2024-09-19 VITALS — BODY MASS INDEX: 43.54 KG/M2 | WEIGHT: 255 LBS | HEIGHT: 64 IN

## 2024-09-19 DIAGNOSIS — J30.1 SEASONAL ALLERGIC RHINITIS DUE TO POLLEN: ICD-10-CM

## 2024-09-19 DIAGNOSIS — R09.82 PND (POST-NASAL DRIP): ICD-10-CM

## 2024-09-19 DIAGNOSIS — J32.9 CHRONIC SINUSITIS, UNSPECIFIED LOCATION: Primary | ICD-10-CM

## 2024-09-19 PROCEDURE — 99214 OFFICE O/P EST MOD 30 MIN: CPT | Mod: S$PBB,,, | Performed by: OTOLARYNGOLOGY

## 2024-09-19 PROCEDURE — 3008F BODY MASS INDEX DOCD: CPT | Mod: CPTII,,, | Performed by: OTOLARYNGOLOGY

## 2024-09-19 PROCEDURE — 1159F MED LIST DOCD IN RCRD: CPT | Mod: CPTII,,, | Performed by: OTOLARYNGOLOGY

## 2024-09-19 PROCEDURE — 99999 PR PBB SHADOW E&M-EST. PATIENT-LVL III: CPT | Mod: PBBFAC,,, | Performed by: OTOLARYNGOLOGY

## 2024-09-19 PROCEDURE — 99213 OFFICE O/P EST LOW 20 MIN: CPT | Mod: PBBFAC | Performed by: OTOLARYNGOLOGY

## 2024-09-19 PROCEDURE — 1160F RVW MEDS BY RX/DR IN RCRD: CPT | Mod: CPTII,,, | Performed by: OTOLARYNGOLOGY

## 2024-09-19 RX ORDER — AMOXICILLIN AND CLAVULANATE POTASSIUM 500; 125 MG/1; MG/1
1 TABLET, FILM COATED ORAL 2 TIMES DAILY
Qty: 28 TABLET | Refills: 0 | Status: SHIPPED | OUTPATIENT
Start: 2024-09-19

## 2024-09-19 NOTE — PROGRESS NOTES
Subjective:       Patient ID: Becky Romero is a 39 y.o. female.    Chief Complaint: Follow-up (Patient following up for sinus. She complains of sinus congestion and drainage. States she is not any better.)    Follow-up  Associated symptoms include congestion.     Review of Systems   HENT:  Positive for congestion, postnasal drip, rhinorrhea and sinus pressure.    All other systems reviewed and are negative.      Objective:      Physical Exam  General: NAD  Head: Normocephalic, atraumatic, no facial asymmetry/normal strength,  Ears: Both auricules normal in appearance, w/o deformities tympanic membranes normal external auditory canals normal  Nose: External nose w/o deformities normal turbinates no drainage or inflammation  Oral Cavity: Lips, gums, floor of mouth, tongue hard palate, and buccal mucosa without mass/lesion PND   Oropharynx: Mucosa pink and moist, soft palate, posterior pharynx and oropharyngeal wall without mass/lesion  Neck: Supple, symmetric, trachea midline, no palpable mass/lesion, no palpable cervical lymphadenopathy  Skin: Warm and dry, no concerning lesions  Respiratory: Respirations even, unlabored  Assessment:       1. Chronic sinusitis, unspecified location    2. Seasonal allergic rhinitis due to pollen    3. PND (post-nasal drip)        Plan:       Augmentin for sinusitis  Start allergy shots

## 2024-09-20 DIAGNOSIS — J32.9 CHRONIC SINUSITIS, UNSPECIFIED LOCATION: Primary | ICD-10-CM

## 2024-09-20 DIAGNOSIS — J30.89 NON-SEASONAL ALLERGIC RHINITIS DUE TO OTHER ALLERGIC TRIGGER: ICD-10-CM

## 2024-09-20 DIAGNOSIS — J31.0 CHRONIC RHINITIS: ICD-10-CM

## 2024-09-20 DIAGNOSIS — R09.82 PND (POST-NASAL DRIP): ICD-10-CM

## 2024-09-20 DIAGNOSIS — J30.1 SEASONAL ALLERGIC RHINITIS DUE TO POLLEN: ICD-10-CM

## 2024-09-20 DIAGNOSIS — J30.1 NON-SEASONAL ALLERGIC RHINITIS DUE TO POLLEN: ICD-10-CM

## 2024-09-20 LAB
HPV 16: NEGATIVE
HPV 18: NEGATIVE
HPV OTHER: NEGATIVE

## 2024-10-23 ENCOUNTER — CLINICAL SUPPORT (OUTPATIENT)
Dept: OTOLARYNGOLOGY | Facility: CLINIC | Age: 39
End: 2024-10-23
Payer: MEDICAID

## 2024-10-23 DIAGNOSIS — J30.1 SEASONAL ALLERGIC RHINITIS DUE TO POLLEN: Primary | ICD-10-CM

## 2024-10-23 DIAGNOSIS — J30.89 NON-SEASONAL ALLERGIC RHINITIS DUE TO OTHER ALLERGIC TRIGGER: ICD-10-CM

## 2024-10-23 DIAGNOSIS — J31.0 CHRONIC RHINITIS: ICD-10-CM

## 2024-10-23 DIAGNOSIS — J30.1 NON-SEASONAL ALLERGIC RHINITIS DUE TO POLLEN: ICD-10-CM

## 2024-10-23 PROCEDURE — 99999 PR PBB SHADOW E&M-EST. PATIENT-LVL II: CPT | Mod: PBBFAC,,,

## 2024-10-23 PROCEDURE — 95165 ANTIGEN THERAPY SERVICES: CPT | Mod: PBBFAC | Performed by: OTOLARYNGOLOGY

## 2024-10-23 PROCEDURE — 95165 ANTIGEN THERAPY SERVICES: CPT | Mod: S$PBB,,, | Performed by: OTOLARYNGOLOGY

## 2024-10-23 PROCEDURE — 95115 IMMUNOTHERAPY ONE INJECTION: CPT | Mod: PBBFAC | Performed by: OTOLARYNGOLOGY

## 2024-10-23 NOTE — PROGRESS NOTES
Established patient with Dr. Snyder. See previous note for written order. Allergy injections per Dr. Snyder.   Vial test administered from new vial A.  10 minute vial test mm reactions  Administered right arm at 12:52 PM  3mm observed   First dose of 0.02ml given.  20 minute mm reaction  12:52 PM   right arm; observation 5mm

## 2024-11-11 ENCOUNTER — CLINICAL SUPPORT (OUTPATIENT)
Dept: OTOLARYNGOLOGY | Facility: CLINIC | Age: 39
End: 2024-11-11
Payer: MEDICAID

## 2024-11-11 DIAGNOSIS — J30.1 SEASONAL ALLERGIC RHINITIS DUE TO POLLEN: Primary | ICD-10-CM

## 2024-11-11 DIAGNOSIS — J30.1 NON-SEASONAL ALLERGIC RHINITIS DUE TO POLLEN: ICD-10-CM

## 2024-11-11 DIAGNOSIS — J30.89 NON-SEASONAL ALLERGIC RHINITIS DUE TO OTHER ALLERGIC TRIGGER: ICD-10-CM

## 2024-11-11 DIAGNOSIS — J31.0 CHRONIC RHINITIS: ICD-10-CM

## 2024-11-11 PROCEDURE — 99999 PR PBB SHADOW E&M-EST. PATIENT-LVL II: CPT | Mod: PBBFAC,,,

## 2024-11-11 PROCEDURE — 95115 IMMUNOTHERAPY ONE INJECTION: CPT | Mod: PBBFAC | Performed by: OTOLARYNGOLOGY

## 2024-11-11 NOTE — PROGRESS NOTES
Established patient with Dr. Snyder. See previous note for written order. Allergy injections per Dr. Snyder.   20 minute mm reaction  10:53 AM   right arm; observation 7mm

## 2025-01-15 ENCOUNTER — OFFICE VISIT (OUTPATIENT)
Dept: DERMATOLOGY | Facility: CLINIC | Age: 40
End: 2025-01-15
Payer: MEDICAID

## 2025-01-15 DIAGNOSIS — J30.9 ALLERGIC SHINERS: Primary | ICD-10-CM

## 2025-01-15 DIAGNOSIS — D48.9 NEOPLASM OF UNCERTAIN BEHAVIOR: ICD-10-CM

## 2025-01-15 PROCEDURE — 11102 TANGNTL BX SKIN SINGLE LES: CPT | Mod: ,,, | Performed by: DERMATOLOGY

## 2025-01-15 PROCEDURE — 99203 OFFICE O/P NEW LOW 30 MIN: CPT | Mod: 25,,, | Performed by: DERMATOLOGY

## 2025-01-15 PROCEDURE — 11103 TANGNTL BX SKIN EA SEP/ADDL: CPT | Mod: ,,, | Performed by: DERMATOLOGY

## 2025-01-15 PROCEDURE — 1160F RVW MEDS BY RX/DR IN RCRD: CPT | Mod: CPTII,,, | Performed by: DERMATOLOGY

## 2025-01-15 PROCEDURE — 88304 TISSUE EXAM BY PATHOLOGIST: CPT | Mod: 26,,, | Performed by: PATHOLOGY

## 2025-01-15 PROCEDURE — 88304 TISSUE EXAM BY PATHOLOGIST: CPT | Mod: TC,SUR | Performed by: DERMATOLOGY

## 2025-01-15 PROCEDURE — 1159F MED LIST DOCD IN RCRD: CPT | Mod: CPTII,,, | Performed by: DERMATOLOGY

## 2025-01-15 NOTE — PROGRESS NOTES
[facility]   Bianca Solis MD    Patient Name: Becky Romero  Patient YOB: 1985   Date of Service: 1/15/25    CC: Lesions    HPI: Becky Romero is a 39 y.o. female here today for lesions, located on the face, neck, and axilla.  Lesions have been present for 3 years.  Previous treatments include none.  Patient is also concerned today about hyperpigmentation under the eyes.    Past Medical History:   Diagnosis Date    Anemia     Anxiety     Cancer     Depression      Past Surgical History:   Procedure Laterality Date    CERVICAL BIOPSY  W/ LOOP ELECTRODE EXCISION       SECTION      TUBAL LIGATION       Review of patient's allergies indicates:   Allergen Reactions    Cat/feline products      Cat epithelium    Cockroach     Dog dander      Dog hair & epithelia    Elder (sambucus nigra, european)      Rough marshelder    Grass pollen-bubba, standard     House dust mite      D. Ptery, D. Farinae       Mold      Alternaria tenuis    Tree pollen-black walnut     Tree pollen-mountain cedar     Tree pollen-pecan     Tree pollen-shagbark hickory     Tree pollen-white birch     Topiramate Rash and Other (See Comments)       Current Outpatient Medications:     albuterol (PROVENTIL/VENTOLIN HFA) 90 mcg/actuation inhaler, Inhale 2 puffs into the lungs every 4 (four) hours., Disp: , Rfl:     amoxicillin-clavulanate 500-125mg (AUGMENTIN) 500-125 mg Tab, Take 1 tablet (500 mg total) by mouth 2 (two) times daily., Disp: 28 tablet, Rfl: 0    metFORMIN (GLUCOPHAGE) 500 MG tablet, Take 1 tablet by mouth 2 (two) times daily., Disp: , Rfl:     phentermine (ADIPEX-P) 37.5 mg tablet, Take 37.5 mg by mouth every morning., Disp: , Rfl:     sertraline (ZOLOFT) 50 MG tablet, Take 50 mg by mouth every morning., Disp: , Rfl:     ROS: A focused review of systems was obtained and negative.     Exam: A focused skin exam was performed. All areas examined were normal except as mentioned in the assessment  and plan below.  General Appearance of the patient is well developed and well nourished.  Orientation: alert and oriented x 3.  Mood and affect: pleasant.    Assessment:   The primary encounter diagnosis was Allergic shiners. A diagnosis of Neoplasm of uncertain behavior was also pertinent to this visit.    Plan:          Neoplasm of Uncertain Behavior (D48.5)  - Brown pedunculated papule located on the left cheek  Ddx includes: Acrochordon    Plan: Biopsy by Shave Method.  Location (1): Left cheek  Written consent was obtained and risks were reviewed including but not  limited to scarring, infection, bleeding, scabbing, incomplete removal, nerve damage and allergy to anesthesia.  The area was prepped with Chloraprep. Local anesthesia was obtained with approximately 0.5cc of 1% lidocaine  with epinephrine. A biopsy by shave method to the level of the dermis (sent for H and E) was performed using  a Dermablade on the above location. Aluminum chloride was used for hemostasis. Following the biopsy  Petrolatum and a bandage were applied. Patient will be notified of biopsy results. However, patient instructed to  call the office if not contacted within 2 weeks.    - Brown pedunculated papules located on the right neck  Ddx includes: Acrochordon    Plan: Biopsy by Shave Method.  Location (2): Right neck  Written consent was obtained and risks were reviewed including but not  limited to scarring, infection, bleeding, scabbing, incomplete removal, nerve damage and allergy to anesthesia.  The area was prepped with Chloraprep. Local anesthesia was obtained with approximately 0.5cc of 1% lidocaine  with epinephrine. A biopsy by shave method to the level of the dermis (sent for H and E) was performed using  a Dermablade on the above location. Aluminum chloride was used for hemostasis. Following the biopsy  Petrolatum and a bandage were applied. Patient will be notified of biopsy results. However, patient instructed to  call the  office if not contacted within 2 weeks.    - Brown pedunculated papules located on the left neck  Ddx includes: Acrochordon    Plan: Biopsy by Shave Method.  Location (3): Left neck  Written consent was obtained and risks were reviewed including but not  limited to scarring, infection, bleeding, scabbing, incomplete removal, nerve damage and allergy to anesthesia.  The area was prepped with Chloraprep. Local anesthesia was obtained with approximately 0.5cc of 1% lidocaine  with epinephrine. A biopsy by shave method to the level of the dermis (sent for H and E) was performed using  a Dermablade on the above location. Aluminum chloride was used for hemostasis. Following the biopsy  Petrolatum and a bandage were applied. Patient will be notified of biopsy results. However, patient instructed to  call the office if not contacted within 2 weeks.    - Brown pedunculated papules located on the right axilla  Ddx includes: acrochordon    Plan: Biopsy by Shave Method.  Location (4): Right axilla   Written consent was obtained and risks were reviewed including but not  limited to scarring, infection, bleeding, scabbing, incomplete removal, nerve damage and allergy to anesthesia.  The area was prepped with Chloraprep. Local anesthesia was obtained with approximately 0.5cc of 1% lidocaine  with epinephrine. A biopsy by shave method to the level of the dermis (sent for H and E) was performed using  a Dermablade on the above location. Aluminum chloride was used for hemostasis. Following the biopsy  Petrolatum and a bandage were applied. Patient will be notified of biopsy results. However, patient instructed to  call the office if not contacted within 2 weeks.    Plan: Counseling.  I counseled the patient regarding the following:  Instructions: Neoplasms of Uncertain Behavior can be observed, biopsied or surgically removed depending on the  level of clinical suspicion.  Instructions: Neoplasms of Uncertain Behavior can be observed,  biopsied or surgically removed depending on the  level of clinical suspicion.  Contact Office if: patient develops any new lesions that fail to heal, ulcerate or bleed.      - Brown pedunculated papules located on the left axilla  Ddx includes: Acrochordon    Plan: Biopsy by Shave Method.  Location (5): Left axilla   Written consent was obtained and risks were reviewed including but not  limited to scarring, infection, bleeding, scabbing, incomplete removal, nerve damage and allergy to anesthesia.  The area was prepped with Chloraprep. Local anesthesia was obtained with approximately 0.5cc of 1% lidocaine  with epinephrine. A biopsy by shave method to the level of the dermis (sent for H and E) was performed using  a Dermablade on the above location. Aluminum chloride was used for hemostasis. Following the biopsy  Petrolatum and a bandage were applied. Patient will be notified of biopsy results. However, patient instructed to  call the office if not contacted within 2 weeks.    Plan: Counseling.  I counseled the patient regarding the following:  Instructions: Neoplasms of Uncertain Behavior can be observed, biopsied or surgically removed depending on the  level of clinical suspicion.  Instructions: Neoplasms of Uncertain Behavior can be observed, biopsied or surgically removed depending on the  level of clinical suspicion.  Contact Office if: patient develops any new lesions that fail to heal, ulcerate or bleed.    Allergic shiners    - reviewed allergies  - recommend adding OTC cetirizine nightly    Follow up if symptoms worsen or fail to improve.    Bianca Solis MD

## 2025-01-17 LAB
ESTROGEN SERPL-MCNC: NORMAL PG/ML
INSULIN SERPL-ACNC: NORMAL U[IU]/ML
LAB AP GROSS DESCRIPTION: NORMAL
LAB AP LABORATORY NOTES: NORMAL
LAB AP SPEC A DDX: NORMAL
LAB AP SPEC A MORPHOLOGY: NORMAL
LAB AP SPEC A PROCEDURE: NORMAL
LAB AP SPEC B DDX: NORMAL
LAB AP SPEC B MORPHOLOGY: NORMAL
LAB AP SPEC B PROCEDURE: NORMAL
LAB AP SPEC C DDX: NORMAL
LAB AP SPEC C MORPHOLOGY: NORMAL
LAB AP SPEC C PROCEDURE: NORMAL
LAB AP SPEC D DDX: NORMAL
LAB AP SPEC D MORPHOLOGY: NORMAL
LAB AP SPEC D PROCEDURE: NORMAL
LAB AP SPEC E DDX: NORMAL
LAB AP SPEC E MORPHOLOGY: NORMAL
LAB AP SPEC E PROCEDURE: NORMAL
T3RU NFR SERPL: NORMAL %

## 2025-02-14 ENCOUNTER — OFFICE VISIT (OUTPATIENT)
Facility: CLINIC | Age: 40
End: 2025-02-14
Payer: MEDICAID

## 2025-02-14 VITALS
WEIGHT: 216 LBS | RESPIRATION RATE: 17 BRPM | DIASTOLIC BLOOD PRESSURE: 81 MMHG | SYSTOLIC BLOOD PRESSURE: 130 MMHG | HEART RATE: 87 BPM | OXYGEN SATURATION: 99 % | BODY MASS INDEX: 36.88 KG/M2 | HEIGHT: 64 IN

## 2025-02-14 DIAGNOSIS — K64.4 EXTERNAL HEMORRHOID: ICD-10-CM

## 2025-02-14 DIAGNOSIS — Z30.42 DEPO-PROVERA CONTRACEPTIVE STATUS: Primary | ICD-10-CM

## 2025-02-14 DIAGNOSIS — E66.9 OBESITY (BMI 30-39.9): ICD-10-CM

## 2025-02-14 DIAGNOSIS — N92.6 MENSTRUAL PROBLEM: ICD-10-CM

## 2025-02-14 RX ORDER — WITCH HAZEL 50 %
1 PADS, MEDICATED (EA) TOPICAL
Qty: 100 EACH | Refills: 1 | Status: SHIPPED | OUTPATIENT
Start: 2025-02-14

## 2025-02-14 RX ORDER — HYDROCORTISONE 25 MG/G
CREAM TOPICAL 2 TIMES DAILY
Qty: 28 G | Refills: 2 | Status: SHIPPED | OUTPATIENT
Start: 2025-02-14

## 2025-02-14 RX ORDER — MEDROXYPROGESTERONE ACETATE 150 MG/ML
150 INJECTION, SUSPENSION INTRAMUSCULAR ONCE
Status: COMPLETED | OUTPATIENT
Start: 2025-02-14 | End: 2025-02-14

## 2025-02-14 RX ADMIN — MEDROXYPROGESTERONE ACETATE 150 MG: 150 INJECTION, SUSPENSION INTRAMUSCULAR at 11:02

## 2025-02-14 NOTE — PROGRESS NOTES
"  Patient ID:  Becky Romero is a 39 y.o. female      Chief Complaint:   Chief Complaint   Patient presents with    Contraception     Start depo        HPI:  Becky Romero is in restart depo injections which she uses for menstrual cycle regulation. Denies ACHES, vag discharge, and abnormal vag bleeding.  C/O hemorroids, requesting meds for treatment.   LMP: Patient's last menstrual period was 2025.  Sexually active: yes    Past Medical History:   Diagnosis Date    Anemia     Anxiety     Cancer     Depression        Past Surgical History:   Procedure Laterality Date    CERVICAL BIOPSY  W/ LOOP ELECTRODE EXCISION       SECTION      TUBAL LIGATION         OB History          8    Para   7    Term                AB        Living             SAB        IAB        Ectopic        Multiple        Live Births   7           Obstetric Comments   Vaginal births x 5,  x 2               /81 (BP Location: Right arm, Patient Position: Sitting)   Pulse 87   Resp 17   Ht 5' 4" (1.626 m)   Wt 98 kg (216 lb)   LMP 2025   SpO2 99%   BMI 37.08 kg/m²   Wt Readings from Last 3 Encounters:   25 98 kg (216 lb)   24 115.7 kg (255 lb)   24 115.9 kg (255 lb 9.6 oz)          ROS:  Review of Systems   Constitutional: Negative.    Eyes: Negative.    Respiratory: Negative.     Cardiovascular: Negative.    Gastrointestinal: Negative.         C/O hemorrhoids   Genitourinary: Negative.    Musculoskeletal: Negative.    Neurological: Negative.    Psychiatric/Behavioral: Negative.            PHYSICAL EXAM:  Physical Exam  Constitutional:       Appearance: Normal appearance. She is obese.   Eyes:      Extraocular Movements: Extraocular movements intact.   Cardiovascular:      Rate and Rhythm: Normal rate.      Pulses: Normal pulses.   Pulmonary:      Effort: Pulmonary effort is normal.   Abdominal:      Palpations: Abdomen is soft.   Musculoskeletal:         General: " "Normal range of motion.      Cervical back: Normal range of motion.   Skin:     General: Skin is warm and dry.   Neurological:      Mental Status: She is alert and oriented to person, place, and time.   Psychiatric:         Mood and Affect: Mood normal.         Behavior: Behavior normal.         Thought Content: Thought content normal.         Judgment: Judgment normal.          Assessment:  Depo-Provera contraceptive status    Menstrual problem  -     medroxyPROGESTERone (DEPO-PROVERA) syringe 150 mg    External hemorrhoid  -     hydrocortisone (ANUSOL-HC) 2.5 % rectal cream; Place rectally 2 (two) times daily.  Dispense: 28 g; Refill: 2  -     witch hazeL (TUCKS, WITCH HAZEL,) 50 % PadM; Apply 1 each topically as needed (hemorrhoids).  Dispense: 100 each; Refill: 1    Obesity (BMI 30-39.9)          ICD-10-CM ICD-9-CM    1. Depo-Provera contraceptive status  Z30.42 V25.49       2. Menstrual problem  N92.6 626.9 medroxyPROGESTERone (DEPO-PROVERA) syringe 150 mg      3. External hemorrhoid  K64.4 455.3 hydrocortisone (ANUSOL-HC) 2.5 % rectal cream      witch hazeL (TUCKS, WITCH HAZEL,) 50 % PadM      4. Obesity (BMI 30-39.9)  E66.9 278.00           Plan:  Depo Provera 150 mg IM given, Reviewed "ACHES" of contraceptives and possible S/E  Encouraged Vitamin D and calcium supplements or daily multivitamin  Rx sent for Anusol HC and Tucks to treat hemorrhoids  Encouraged increased water intake and high fiber diet to avoid constipation    Follow up in about 3 months (around 5/14/2025) for repeat depo injection .                    "

## 2025-05-12 DIAGNOSIS — G43.909 MIGRAINES: Primary | ICD-10-CM

## 2025-05-29 ENCOUNTER — OFFICE VISIT (OUTPATIENT)
Facility: CLINIC | Age: 40
End: 2025-05-29
Payer: MEDICAID

## 2025-05-29 VITALS
OXYGEN SATURATION: 99 % | RESPIRATION RATE: 17 BRPM | HEART RATE: 80 BPM | BODY MASS INDEX: 37.56 KG/M2 | SYSTOLIC BLOOD PRESSURE: 127 MMHG | WEIGHT: 220 LBS | DIASTOLIC BLOOD PRESSURE: 88 MMHG | HEIGHT: 64 IN

## 2025-05-29 DIAGNOSIS — E66.9 OBESITY (BMI 30-39.9): ICD-10-CM

## 2025-05-29 DIAGNOSIS — Z30.42 SURVEILLANCE OF CONTRACEPTIVE INJECTION: Primary | ICD-10-CM

## 2025-05-29 RX ORDER — LISDEXAMFETAMINE DIMESYLATE 40 MG/1
40 CAPSULE ORAL DAILY
COMMUNITY
Start: 2025-02-20

## 2025-05-29 RX ORDER — CARVEDILOL 6.25 MG/1
6.25 TABLET ORAL 2 TIMES DAILY
COMMUNITY

## 2025-05-29 RX ORDER — MEDROXYPROGESTERONE ACETATE 150 MG/ML
150 INJECTION, SUSPENSION INTRAMUSCULAR ONCE
Status: COMPLETED | OUTPATIENT
Start: 2025-05-29 | End: 2025-05-29

## 2025-05-29 RX ORDER — SEMAGLUTIDE 0.5 MG/.5ML
0.5 INJECTION, SOLUTION SUBCUTANEOUS
COMMUNITY
Start: 2025-05-12

## 2025-05-29 RX ADMIN — MEDROXYPROGESTERONE ACETATE 150 MG: 150 INJECTION, SUSPENSION INTRAMUSCULAR at 10:05

## 2025-05-29 NOTE — PROGRESS NOTES
"  Patient ID:  Becky Romero is a 40 y.o. female      Chief Complaint:   Chief Complaint   Patient presents with    Contraception     Depo, states that since she has started the depo she is having issues with her face breaking out        HPI:  Becky Romero is in for repeat depo injection within dates. Denies ACHES, vag discharge, and abnormal vag bleeding. Desires to continue depo injections. Reports facial acne since restarting depo. Discussed skin care. Has seen Dr. Solis in the past for other derm issue.   LMP: No LMP recorded. Patient has had an injection.  Sexually active: not currently     Past Medical History:   Diagnosis Date    Anemia     Anxiety     Cancer     Depression        Past Surgical History:   Procedure Laterality Date    CERVICAL BIOPSY  W/ LOOP ELECTRODE EXCISION       SECTION      TUBAL LIGATION         OB History          8    Para   7    Term                AB        Living             SAB        IAB        Ectopic        Multiple        Live Births   7           Obstetric Comments   Vaginal births x 5,  x 2               /88 (BP Location: Right arm, Patient Position: Sitting)   Pulse 80   Resp 17   Ht 5' 4" (1.626 m)   Wt 99.8 kg (220 lb)   SpO2 99%   BMI 37.76 kg/m²   Wt Readings from Last 3 Encounters:   25 99.8 kg (220 lb)   25 98 kg (216 lb)   24 115.7 kg (255 lb)          ROS:  Review of Systems   Constitutional: Negative.    Eyes: Negative.    Respiratory: Negative.     Cardiovascular: Negative.    Gastrointestinal: Negative.    Genitourinary: Negative.    Musculoskeletal: Negative.    Integumentary:  Positive for acne.   Neurological: Negative.    Psychiatric/Behavioral: Negative.            PHYSICAL EXAM:  Physical Exam  Constitutional:       Appearance: Normal appearance. She is obese.   HENT:      Head:     Eyes:      Extraocular Movements: Extraocular movements intact.   Cardiovascular:      Rate and Rhythm: " "Normal rate.      Pulses: Normal pulses.   Pulmonary:      Effort: Pulmonary effort is normal.   Musculoskeletal:         General: Normal range of motion.      Cervical back: Normal range of motion.   Skin:     General: Skin is warm and dry.   Neurological:      Mental Status: She is alert and oriented to person, place, and time.   Psychiatric:         Mood and Affect: Mood normal.         Behavior: Behavior normal.         Thought Content: Thought content normal.         Judgment: Judgment normal.          Assessment:  Surveillance of contraceptive injection  -     medroxyPROGESTERone (DEPO-PROVERA) syringe 150 mg    Obesity (BMI 30-39.9)          ICD-10-CM ICD-9-CM    1. Surveillance of contraceptive injection  Z30.42 V25.49 medroxyPROGESTERone (DEPO-PROVERA) syringe 150 mg      2. Obesity (BMI 30-39.9)  E66.9 278.00           Plan:  Depo Provera 150 mg IM given, Reviewed "ACHES" of contraceptives and possible S/E  Encouraged Vitamin D and calcium supplements or daily multivitamin  Instructed on condom use during each sexual encounter to decrease STD exposure risk  Discussed facial care, use gently skin cleanser such as Cerave or Cetaphil and noncomedogenic moisturizer/makeup  Follow up with dermatology if no improvement of acne     Follow up in about 3 months (around 8/29/2025) for repeat depo injection .                    "

## 2025-07-14 ENCOUNTER — OFFICE VISIT (OUTPATIENT)
Dept: NEUROLOGY | Facility: CLINIC | Age: 40
End: 2025-07-14
Payer: MEDICAID

## 2025-07-14 VITALS
BODY MASS INDEX: 35.34 KG/M2 | OXYGEN SATURATION: 100 % | HEART RATE: 94 BPM | RESPIRATION RATE: 18 BRPM | HEIGHT: 64 IN | SYSTOLIC BLOOD PRESSURE: 98 MMHG | DIASTOLIC BLOOD PRESSURE: 70 MMHG | WEIGHT: 207 LBS

## 2025-07-14 DIAGNOSIS — G44.221 CHRONIC TENSION-TYPE HEADACHE, INTRACTABLE: ICD-10-CM

## 2025-07-14 DIAGNOSIS — G43.909 MIGRAINES: Primary | ICD-10-CM

## 2025-07-14 PROCEDURE — 3008F BODY MASS INDEX DOCD: CPT | Mod: CPTII,,, | Performed by: PSYCHIATRY & NEUROLOGY

## 2025-07-14 PROCEDURE — 3044F HG A1C LEVEL LT 7.0%: CPT | Mod: CPTII,,, | Performed by: PSYCHIATRY & NEUROLOGY

## 2025-07-14 PROCEDURE — 99204 OFFICE O/P NEW MOD 45 MIN: CPT | Mod: S$PBB,,, | Performed by: PSYCHIATRY & NEUROLOGY

## 2025-07-14 PROCEDURE — 99999 PR PBB SHADOW E&M-EST. PATIENT-LVL V: CPT | Mod: PBBFAC,,, | Performed by: PSYCHIATRY & NEUROLOGY

## 2025-07-14 PROCEDURE — 1159F MED LIST DOCD IN RCRD: CPT | Mod: CPTII,,, | Performed by: PSYCHIATRY & NEUROLOGY

## 2025-07-14 PROCEDURE — 1160F RVW MEDS BY RX/DR IN RCRD: CPT | Mod: CPTII,,, | Performed by: PSYCHIATRY & NEUROLOGY

## 2025-07-14 PROCEDURE — 3078F DIAST BP <80 MM HG: CPT | Mod: CPTII,,, | Performed by: PSYCHIATRY & NEUROLOGY

## 2025-07-14 PROCEDURE — 99215 OFFICE O/P EST HI 40 MIN: CPT | Mod: PBBFAC | Performed by: PSYCHIATRY & NEUROLOGY

## 2025-07-14 PROCEDURE — 3074F SYST BP LT 130 MM HG: CPT | Mod: CPTII,,, | Performed by: PSYCHIATRY & NEUROLOGY

## 2025-07-14 RX ORDER — FERROUS SULFATE 325(65) MG
TABLET, DELAYED RELEASE (ENTERIC COATED) ORAL 2 TIMES DAILY
COMMUNITY

## 2025-07-14 RX ORDER — CETIRIZINE HYDROCHLORIDE 10 MG/1
10 TABLET ORAL
COMMUNITY
Start: 2025-03-26

## 2025-07-14 RX ORDER — ZOLPIDEM TARTRATE 5 MG/1
5 TABLET ORAL NIGHTLY PRN
COMMUNITY
Start: 2025-06-10

## 2025-07-14 RX ORDER — AMITRIPTYLINE HYDROCHLORIDE 25 MG/1
25 TABLET, FILM COATED ORAL NIGHTLY
Qty: 90 TABLET | Refills: 3 | Status: SHIPPED | OUTPATIENT
Start: 2025-07-14 | End: 2026-07-14

## 2025-07-14 NOTE — PROGRESS NOTES
HPI/Subjective/ROS      History of Present Illness    CHIEF COMPLAINT:  Patient presents with a history of chronic headaches and migraines for evaluation and management.    HPI:  Patient reports a long-standing history of headaches since childhood, which worsened at age 15 following an incident where an iron bed frame fell on her head. She has had daily morning headaches for the past 25 years, distinct from her migraines. She has migraines approximately twice weekly, with photophobia during these episodes.    She identifies several triggers for her migraines, including spicy foods and strong smells such as perfumes. Exposure to these triggers can immediately induce a headache. She also mentions poor sleep quality, which may be contributing to her headache condition.    In terms of treatment, she has tried various migraine medications in the past but cannot recall their names. She used Nurtec within the last 3 years, which was sometimes effective, but has not taken it in over a year. She was previously under the care of a neurologist who left the practice about 3.3 years ago. She was also evaluated by another physician for her migraines about 7 years ago, who has since passed away due to pancreatic cancer.    Her migraine episodes can last up to 2 days. She was recently prescribed Ambien to help with sleep, but it appears to be ineffective in improving her sleep quality or reducing her headaches.    She denies that her daily morning headaches are migraines.      ROS:  General: -fever, -chills, -fatigue, -weight gain, -weight loss, +sleep disturbances  Eyes: -vision changes, -redness, -discharge, +photophobia  ENT: -ear pain, -nasal congestion, -sore throat  Cardiovascular: -chest pain, -palpitations, -lower extremity edema  Respiratory: -cough, -shortness of breath  Gastrointestinal: -abdominal pain, -nausea, -vomiting, -diarrhea, -constipation, -blood in stool  Genitourinary: -dysuria, -hematuria,  "-frequency  Musculoskeletal: -joint pain, -muscle pain  Skin: -rash, -lesion  Neurological: +headache, -dizziness, -numbness, -tingling, +migraines  Psychiatric: -anxiety, -depression, -sleep difficulty          Allergies:  Cat/feline products; Cockroach; Dog dander; Elder (sambucus nigra, european); Grass pollen-bubba, standard; House dust mite; Mold; Tree pollen-black walnut; Tree pollen-mountain cedar; Tree pollen-pecan; Tree pollen-shagbark hickory; Tree pollen-white birch; and Topiramate    Current Medications:  Encounter Medications[1]    Past Medical History:   Past Medical History:   Diagnosis Date    Anemia     Anxiety     Cancer     Depression        Surgical History:   Past Surgical History:   Procedure Laterality Date    CERVICAL BIOPSY  W/ LOOP ELECTRODE EXCISION       SECTION      TUBAL LIGATION         Social History  Ms. Romero  reports that she has never smoked. She has never been exposed to tobacco smoke. She has never used smokeless tobacco. She reports that she does not drink alcohol and does not use drugs.    Family History  Ms.'s Romero family history includes Asthma in her mother; Cervical cancer in her mother; Migraines in her mother.      Objective:   BP 98/70 (BP Location: Right arm, Patient Position: Sitting)   Pulse 94   Resp 18   Ht 5' 4" (1.626 m)   Wt 93.9 kg (207 lb)   SpO2 100%   BMI 35.53 kg/m²          Physical Exam    General: No acute distress. Well-developed. Well-nourished.  Eyes: EOMI. Sclerae anicteric.  HENT: Normocephalic. Atraumatic. Nares patent. Moist oral mucosa.  Ears: Bilateral TMs clear. Bilateral EACs clear.  Cardiovascular: Regular rate. Regular rhythm. No murmurs. No rubs. No gallops. Normal S1, S2.  Respiratory: Normal respiratory effort. Clear to auscultation bilaterally. No rales. No rhonchi. No wheezing.  Abdomen: Soft. Non-tender. Non-distended. Normoactive bowel sounds.  Musculoskeletal: No  obvious deformity.  Extremities: No lower " extremity edema.  Neurological: Alert & oriented x3. No slurred speech. Normal gait.  Psychiatric: Normal mood. Normal affect. Good insight. Good judgment.  Skin: Warm. Dry. No rash.          Assessment:     Assessment & Plan    IMPRESSION:  - Assessed long-standing history of headaches, including migraines and chronic daily headaches.  - Evaluated current sleep issues and ineffectiveness of Ambien for sleep and headache management.  - Determined Elavil (amitriptyline) as appropriate treatment due to its dual action on chronic headaches and sleep improvement; started at 25 mg to be taken 1-2 hours before bedtime.  - BP well-controlled, allowing for Elavil prescription.  - Explained that chronic daily headaches may not be migraines, as migraines typically do not persist continuously for years.  - Discussed the relationship between poor sleep and headaches.    CHRONIC MIGRAINE AND TENSION HEADACHE MANAGEMENT:  - Patient to continue avoiding spicy foods as a known trigger for migraines.  - Patient to maintain avoidance of strong perfumes and powerful smells to prevent headaches.  - Patient to continue controlling BP and managing other risk factors for headaches.  - Informed about the potential benefits of Elavil for both headache management and sleep improvement.  - Determined Elavil (amitriptyline) as appropriate treatment due to its dual action on chronic headaches and sleep improvement; started at 25 mg to be taken 1-2 hours before bedtime.    SLEEP DISORDER:  - Informed about the potential benefits of Elavil for both headache management and sleep improvement.  - Determined Elavil (amitriptyline) as appropriate treatment due to its dual action on chronic headaches and sleep improvement; started at 25 mg to be taken 1-2 hours before bedtime.  - Cautioned about potential side effects of Ambien, including dissociation.  - Discontinued Ambien due to ineffectiveness for headaches and sleep issues.          Primary  Diagnosis and ICD10  Migraines [G43.909]    Plan:     There are no Patient Instructions on file for this visit.    There are no discontinued medications.    Requested Prescriptions     Signed Prescriptions Disp Refills    amitriptyline (ELAVIL) 25 MG tablet 90 tablet 3     Sig: Take 1 tablet (25 mg total) by mouth every evening.       This note was generated with the assistance of ambient listening technology. Verbal consent was obtained by the patient and accompanying visitor(s) for the recording of patient appointment to facilitate this note. I attest to having reviewed and edited the generated note for accuracy, though some syntax or spelling errors may persist. Please contact the author of this note for any clarification.            [1]   Outpatient Encounter Medications as of 7/14/2025   Medication Sig Dispense Refill    albuterol (PROVENTIL/VENTOLIN HFA) 90 mcg/actuation inhaler Inhale 2 puffs into the lungs every 4 (four) hours.      carvediloL (COREG) 6.25 MG tablet Take 6.25 mg by mouth 2 (two) times daily.      cetirizine (ZYRTEC) 10 MG tablet Take 10 mg by mouth.      ferrous sulfate 325 (65 FE) MG EC tablet Take by mouth 2 (two) times daily.      hydrocortisone (ANUSOL-HC) 2.5 % rectal cream Place rectally 2 (two) times daily. 28 g 2    lisdexamfetamine (VYVANSE) 40 MG Cap Take 40 mg by mouth once daily.      WEGOVY 0.5 mg/0.5 mL PnIj Inject 0.5 mg into the skin every 7 days.      zolpidem (AMBIEN) 5 MG Tab Take 5 mg by mouth nightly as needed.      amitriptyline (ELAVIL) 25 MG tablet Take 1 tablet (25 mg total) by mouth every evening. 90 tablet 3     No facility-administered encounter medications on file as of 7/14/2025.